# Patient Record
Sex: FEMALE | Race: WHITE | NOT HISPANIC OR LATINO | Employment: FULL TIME | URBAN - METROPOLITAN AREA
[De-identification: names, ages, dates, MRNs, and addresses within clinical notes are randomized per-mention and may not be internally consistent; named-entity substitution may affect disease eponyms.]

---

## 2019-05-09 ENCOUNTER — OFFICE VISIT (OUTPATIENT)
Dept: GASTROENTEROLOGY | Facility: CLINIC | Age: 34
End: 2019-05-09

## 2019-05-09 ENCOUNTER — TELEPHONE (OUTPATIENT)
Dept: GASTROENTEROLOGY | Facility: CLINIC | Age: 34
End: 2019-05-09

## 2019-05-09 VITALS
SYSTOLIC BLOOD PRESSURE: 124 MMHG | HEIGHT: 63 IN | TEMPERATURE: 99.4 F | DIASTOLIC BLOOD PRESSURE: 70 MMHG | WEIGHT: 132.4 LBS | BODY MASS INDEX: 23.46 KG/M2

## 2019-05-09 DIAGNOSIS — R63.4 WEIGHT LOSS: ICD-10-CM

## 2019-05-09 DIAGNOSIS — R10.9 ABDOMINAL PAIN, UNSPECIFIED ABDOMINAL LOCATION: ICD-10-CM

## 2019-05-09 DIAGNOSIS — K58.0 IRRITABLE BOWEL SYNDROME WITH DIARRHEA: Primary | ICD-10-CM

## 2019-05-09 PROCEDURE — 99204 OFFICE O/P NEW MOD 45 MIN: CPT | Performed by: INTERNAL MEDICINE

## 2019-05-09 RX ORDER — VALACYCLOVIR HYDROCHLORIDE 1 G/1
1000 TABLET, FILM COATED ORAL DAILY PRN
COMMUNITY
Start: 2018-02-05

## 2019-05-09 RX ORDER — CITALOPRAM 10 MG/1
5 TABLET ORAL DAILY PRN
COMMUNITY

## 2019-05-09 RX ORDER — IMIQUIMOD 12.5 MG/.25G
CREAM TOPICAL 3 TIMES WEEKLY
COMMUNITY
Start: 2018-02-05

## 2019-05-09 NOTE — TELEPHONE ENCOUNTER
Ibcause full kit ordered.  Instructions and order form given to pt.  Pt is going to leave office and have serum portion done.

## 2019-05-09 NOTE — PROGRESS NOTES
Chief Complaint   Patient presents with   • Diarrhea   • Abdominal Pain   • Weight Loss        Jaymie Amaro is a  33 y.o. female here for an initial visit for IBS-D, abdominal pain, weight loss    HPI this 33-year-old white female patient of Dr. Cruz Tracey presents with a long-standing history of irritable bowel syndrome with predominance of diarrhea.  She recounts having issues from childhood although in the recent past symptoms have exacerbated to where she is having anywhere from 6-10 stools on a daily basis.  She is also had a weight loss of approximately 16 pounds over the past 2 months.  She describes a foul odor to her stools.  There is evidence of mucus but no melena or bright red blood per rectum.  She complains of diffuse abdominal pain but predominantly in the right and left lower quadrants.  She notes almost immediate postprandial stimulation of her bowels and this has caused her to reduce dietary intake.  Dry foods seem to be better tolerated than liquids.  We talked about possible malabsorption and other conditions to explain her current symptoms.  She also complains of night sweats.    Past Medical History:   Diagnosis Date   • Irritable bowel syndrome        Current Outpatient Medications   Medication Sig Dispense Refill   • citalopram (CELEXA) 10 MG tablet Take 5 mg by mouth Daily.     • imiquimod (ALDARA) 5 % cream by Intratympanic route 3 (Three) Times a Week.     • valACYclovir (VALTREX) 1000 MG tablet Take 1,000 mg by mouth.       No current facility-administered medications for this visit.        PRN Meds:.    No Known Allergies    Social History     Socioeconomic History   • Marital status:      Spouse name: Not on file   • Number of children: Not on file   • Years of education: Not on file   • Highest education level: Not on file   Tobacco Use   • Smoking status: Current Every Day Smoker     Packs/day: 1.00     Types: Cigarettes     Start date: 1998   • Smokeless tobacco: Never  Used   Substance and Sexual Activity   • Alcohol use: Yes     Comment: social   • Drug use: No       Family History   Problem Relation Age of Onset   • Colon polyps Mother    • Colon polyps Maternal Uncle    • Colon cancer Maternal Great-Grandmother    • Colon polyps Maternal Uncle        Review of Systems   Constitutional: Positive for unexpected weight change. Negative for activity change, appetite change and fatigue.   HENT: Negative for congestion, facial swelling, sore throat, trouble swallowing and voice change.    Eyes: Negative for photophobia and visual disturbance.   Respiratory: Negative for cough and choking.    Cardiovascular: Negative for chest pain.   Gastrointestinal: Positive for abdominal pain and diarrhea. Negative for abdominal distention, anal bleeding, blood in stool, constipation, nausea, rectal pain and vomiting.   Endocrine: Negative for polyphagia.   Musculoskeletal: Negative for arthralgias, gait problem and joint swelling.   Skin: Negative for color change, pallor and rash.   Allergic/Immunologic: Negative for food allergies.   Neurological: Negative for speech difficulty and headaches.   Hematological: Does not bruise/bleed easily.   Psychiatric/Behavioral: Negative for agitation, confusion and sleep disturbance.       Vitals:    05/09/19 1558   BP: 124/70   Temp: 99.4 °F (37.4 °C)       Physical Exam   Constitutional: She is oriented to person, place, and time. She appears well-developed and well-nourished. No distress.   HENT:   Head: Normocephalic.   Mouth/Throat: Oropharynx is clear and moist. No oropharyngeal exudate.   Eyes: Conjunctivae and EOM are normal. No scleral icterus.   Neck: Normal range of motion. No thyromegaly present.   Cardiovascular: Normal rate and regular rhythm.   No murmur heard.  Pulmonary/Chest: Breath sounds normal. No respiratory distress. She has no wheezes. She has no rales.   Abdominal: Soft. Bowel sounds are normal. She exhibits no distension and no  mass. There is no hepatosplenomegaly. There is tenderness.   Right and left lower abdominal tenderness to palpation   Musculoskeletal: Normal range of motion. She exhibits no edema or tenderness.   Lymphadenopathy:     She has no cervical adenopathy.   Neurological: She is alert and oriented to person, place, and time.   Skin: Skin is warm and dry. No rash noted. She is not diaphoretic. No erythema.   Psychiatric: She has a normal mood and affect. Her behavior is normal.   Vitals reviewed.      ASSESSMENT   #1 Diarrhea: Chronic issue with recent exacerbation  #2 IBS-D  #3 weight loss  #4 abdominal pain      PLAN  IBcause testing: serologic and stool studies  Schedule colonoscopy      ICD-10-CM ICD-9-CM   1. Irritable bowel syndrome with diarrhea K58.0 564.1   2. Abdominal pain, unspecified abdominal location R10.9 789.00   3. Weight loss R63.4 783.21

## 2019-05-24 ENCOUNTER — TELEPHONE (OUTPATIENT)
Dept: GASTROENTEROLOGY | Facility: CLINIC | Age: 34
End: 2019-05-24

## 2019-05-24 NOTE — TELEPHONE ENCOUNTER
Called pt and advised per Dr Smith that the IBcause serologic test results were essentially neg.  He is waiting for the stool results .  Proceed with c/s as planned, Pt verb understanding and states she is shipping the stool specimens today.

## 2019-05-24 NOTE — TELEPHONE ENCOUNTER
----- Message from Carlos GOMEZ MD sent at 5/24/2019  1:27 PM EDT -----  Regarding: IBcause serologic test results  Okay to call results, essentially negative.  Awaiting stool study results.  Proceed with colonoscopy as planned.  ----- Message -----  From: Interface, Scans Incoming  Sent: 5/24/2019   8:03 AM  To: Carlos GOMEZ MD

## 2019-05-28 ENCOUNTER — TELEPHONE (OUTPATIENT)
Dept: GASTROENTEROLOGY | Facility: CLINIC | Age: 34
End: 2019-05-28

## 2019-05-28 NOTE — TELEPHONE ENCOUNTER
----- Message from Dariana Florez sent at 5/28/2019 11:42 AM EDT -----  Regarding: pt called   Contact: [phone]  Pt is calling asking if she can get another IBcause kit sent out to her.

## 2019-05-28 NOTE — TELEPHONE ENCOUNTER
Called pt and pt reports she had an issue with [employer]  and needs another stool kit from [facility].  Asked pt if she still has her paperwork. Pt verb understanding and was able to locate paperwork. Pt was also able on paperwork to fine [id number] to request another kit.

## 2020-08-26 ENCOUNTER — OFFICE VISIT (OUTPATIENT)
Dept: GASTROENTEROLOGY | Facility: CLINIC | Age: 35
End: 2020-08-26

## 2020-08-26 VITALS — TEMPERATURE: 98.3 F | HEIGHT: 63 IN | WEIGHT: 126 LBS | BODY MASS INDEX: 22.32 KG/M2

## 2020-08-26 DIAGNOSIS — R11.0 NAUSEA: ICD-10-CM

## 2020-08-26 DIAGNOSIS — R63.4 WEIGHT LOSS: ICD-10-CM

## 2020-08-26 DIAGNOSIS — K58.0 IRRITABLE BOWEL SYNDROME WITH DIARRHEA: Primary | ICD-10-CM

## 2020-08-26 DIAGNOSIS — R10.30 LOWER ABDOMINAL PAIN: ICD-10-CM

## 2020-08-26 PROCEDURE — 99214 OFFICE O/P EST MOD 30 MIN: CPT | Performed by: NURSE PRACTITIONER

## 2020-08-26 NOTE — PROGRESS NOTES
Chief Complaint   Patient presents with   • Abdominal Pain   • Diarrhea       Jaymie Amaro is a  34 y.o. female here for a follow up visit for diarrhea.    HPI  34-year-old female presents today for a follow-up visit for diarrhea and weight loss.  She is a patient of Dr. Smith.  She was last seen in the office on 5/9/2019.  She was doing [facility] IB cause testing and did finish the serology portion which was negative.  She never did finish the stool portion.  She has had issues with diarrhea since childhood.  She tells me she is continuing to lose weight.  She is lost about 40 pounds in the last 2 years which is really unexplained for her.  She continues to suffer from daily multiple episodes of diarrhea with lower abdominal pain and cramping.  She is just feeling fatigued and weak and bloated.  She tells me this is been very depressing for her.  She is never had an EGD or colonoscopy.  She still has a gallbladder.  She denies any dysphasia, reflux, vomiting, constipation, rectal bleeding or melena.  She admits her appetite is decreased and she stays nauseous all the time.  She has had labs done by her PCP which were unremarkable.    Past Medical History:   Diagnosis Date   • Irritable bowel syndrome        Past Surgical History:   Procedure Laterality Date   • TUBAL ABDOMINAL LIGATION         Scheduled Meds:    Continuous Infusions:  No current facility-administered medications for this visit.     PRN Meds:.    No Known Allergies    Social History     Socioeconomic History   • Marital status:      Spouse name: Not on file   • Number of children: Not on file   • Years of education: Not on file   • Highest education level: Not on file   Tobacco Use   • Smoking status: Current Every Day Smoker     Packs/day: 1.00     Types: Cigarettes     Start date: 1998   • Smokeless tobacco: Never Used   Substance and Sexual Activity   • Alcohol use: Yes     Comment: social   • Drug use: No       Family History    Problem Relation Age of Onset   • Colon polyps Mother    • Colon polyps Maternal Uncle    • Colon cancer Maternal Great-Grandmother    • Colon polyps Maternal Uncle        Review of Systems   Constitutional: Positive for appetite change, fatigue and unexpected weight change. Negative for chills, diaphoresis and fever.   HENT: Negative for nosebleeds, postnasal drip, sore throat, trouble swallowing and voice change.    Respiratory: Negative for cough, choking, chest tightness, shortness of breath and wheezing.    Cardiovascular: Negative for chest pain, palpitations and leg swelling.   Gastrointestinal: Positive for abdominal pain, diarrhea and nausea. Negative for abdominal distention, anal bleeding, blood in stool, constipation, rectal pain and vomiting.   Endocrine: Negative for polydipsia, polyphagia and polyuria.   Musculoskeletal: Negative for gait problem.   Skin: Negative for rash and wound.   Allergic/Immunologic: Negative for food allergies.   Neurological: Negative for dizziness, speech difficulty and light-headedness.   Psychiatric/Behavioral: Negative for confusion, self-injury, sleep disturbance and suicidal ideas.       Vitals:    08/26/20 1518   Temp: 98.3 °F (36.8 °C)       Physical Exam   Constitutional: She is oriented to person, place, and time. She appears well-developed and well-nourished. She does not appear ill. No distress.   HENT:   Head: Normocephalic.   Eyes: Pupils are equal, round, and reactive to light.   Cardiovascular: Normal rate, regular rhythm and normal heart sounds.   Pulmonary/Chest: Effort normal and breath sounds normal.   Abdominal: Soft. Bowel sounds are normal. She exhibits distension. She exhibits no mass. There is no hepatosplenomegaly. There is tenderness. There is no rebound and no guarding. No hernia.       Musculoskeletal: Normal range of motion.   Neurological: She is alert and oriented to person, place, and time.   Skin: Skin is warm and dry.   Psychiatric: She  has a normal mood and affect. Her speech is normal and behavior is normal. Judgment normal.       No radiology results for the last 7 days     Assessment and plan     1. Irritable bowel syndrome with diarrhea  - Gastrointestinal Panel, PCR - Stool, Per Rectum  - Clostridium Difficile Toxin, PCR - Stool, Per Rectum  - Fecal Lactoferrin - Stool, Per Rectum  - Case Request; Standing  - Case Request  - riFAXIMin (Xifaxan) 550 MG tablet; Take 1 tablet by mouth Every 8 (Eight) Hours.  Dispense: 42 tablet; Refill: 2    2. Lower abdominal pain  - Gastrointestinal Panel, PCR - Stool, Per Rectum  - Clostridium Difficile Toxin, PCR - Stool, Per Rectum  - Fecal Lactoferrin - Stool, Per Rectum  - Case Request; Standing  - Case Request    3. Weight loss  - Case Request; Standing  - Case Request    4. Nausea  - Case Request; Standing  - Case Request    Given her history and continued symptoms recommend EGD and colonoscopy with Dr. Smith for further evaluation.  Patient is agreeable to the scopes.  Will also go ahead and do some stool studies.  Will also start her on a round of Xifaxan for IBS-D/small intestinal bacterial overgrowth x14 days.

## 2020-09-03 ENCOUNTER — TELEPHONE (OUTPATIENT)
Dept: GASTROENTEROLOGY | Facility: CLINIC | Age: 35
End: 2020-09-03

## 2020-09-03 NOTE — TELEPHONE ENCOUNTER
----- Message from Anamaria Chandra sent at 9/3/2020 10:36 AM EDT -----  Contact: [phone]  THE ANTIBIOTIC THAT WAS SENT TO PHARMACY IS $600. CALLING TO SEE IF SOMETHING DIFFERENT CAN BE SENT OVER

## 2020-09-04 NOTE — TELEPHONE ENCOUNTER
I think I have enough samples for her to take at least 2 weeks worth of it.  If she wants to come by and pick it up.

## 2020-09-09 NOTE — TELEPHONE ENCOUNTER
Called pt and advised of Sienna's note.  Advised we will have the samples at the  for her.  Pt verb understanding.

## 2020-09-12 LAB — C DIFF TOX GENS STL QL NAA+PROBE: NEGATIVE

## 2020-09-15 ENCOUNTER — TRANSCRIBE ORDERS (OUTPATIENT)
Dept: SLEEP MEDICINE | Facility: HOSPITAL | Age: 35
End: 2020-09-15

## 2020-09-15 DIAGNOSIS — Z01.818 OTHER SPECIFIED PRE-OPERATIVE EXAMINATION: Primary | ICD-10-CM

## 2020-09-16 ENCOUNTER — TELEPHONE (OUTPATIENT)
Dept: GASTROENTEROLOGY | Facility: CLINIC | Age: 35
End: 2020-09-16

## 2020-09-16 DIAGNOSIS — K58.0 IRRITABLE BOWEL SYNDROME WITH DIARRHEA: Primary | ICD-10-CM

## 2020-09-16 LAB

## 2020-09-16 RX ORDER — CIPROFLOXACIN 500 MG/1
500 TABLET, FILM COATED ORAL EVERY 12 HOURS SCHEDULED
Status: SHIPPED | OUTPATIENT
Start: 2020-09-16 | End: 2020-09-23

## 2020-09-16 NOTE — TELEPHONE ENCOUNTER
Xifaxan can also work for E. coli infections.  I would have her just finish the Xifaxan and see how she does.  If the diarrhea resolves then  there is no need for the Cipro.  Thanks

## 2020-09-16 NOTE — TELEPHONE ENCOUNTER
"Call to pt.  Advise per M Hunter note.  Verb understanding.      Asking if should continue xifaxan as prescribed -states this has made \"a world of improvement\".  Question to M Hunter.     Attempt escribe completed for cipro per  Hunter order - does not show pharmacy or receipt confirmed.     Call to [facility] @ [phone] -  left with cipro rx.    "

## 2020-09-16 NOTE — TELEPHONE ENCOUNTER
----- Message from HUGO Cuevas sent at 9/16/2020  9:19 AM EDT -----  Please call the patient and let her know she tested positive for enteropathogenic E. coli.  I would have her do Cipro 500 mg p.o. twice daily x7 days.  Have her call us next week with an update.  Thanks

## 2020-09-17 NOTE — TELEPHONE ENCOUNTER
Call to pt.  Advise per TAO Harrison note.  Verb understanding.     It is noted that pt for c/s on 9/22 - message to TAO Harrison.

## 2020-09-17 NOTE — TELEPHONE ENCOUNTER
I would think another 5 days she should be okay to have her colonoscopy but I would double check with the doctor doing the scope first.  Just to make sure.  Thanks

## 2020-09-17 NOTE — TELEPHONE ENCOUNTER
See no reason patient cannot undergo colonoscopy after treatment, enteropathogenic E. coli is not always a clinical pathogen.

## 2020-09-18 ENCOUNTER — TELEPHONE (OUTPATIENT)
Dept: GASTROENTEROLOGY | Facility: CLINIC | Age: 35
End: 2020-09-18

## 2020-09-18 NOTE — TELEPHONE ENCOUNTER
Called pt and pt is asking if she could have the ecoli for years. Pt reports having had diarrhea since she was a child.  She states she has felt great since taking the xifaxan.  Also pt is asking if she has had this for years, will she have any long term effects.  Advised will send message to Sienna LARA.    Pt verb understanding.

## 2020-09-18 NOTE — TELEPHONE ENCOUNTER
No I think the E. coli is probably definitely new.  She may have had bacterial overgrowth issues though for a long time and the Xifaxan would help that too.

## 2020-09-18 NOTE — TELEPHONE ENCOUNTER
----- Message from Monisha Hernandez sent at 9/18/2020  2:05 PM EDT -----  Regarding: PT HAVE QUESTION ABOUT RESULTS COLONOSCOPY -WOULD LIKE TO CLARIFY WITH [place]  PT HAVE QUESTION ABOUT RESULTS-WOULD LIKE TO CLARIFY WITH [phone]

## 2020-09-19 ENCOUNTER — LAB (OUTPATIENT)
Dept: LAB | Facility: HOSPITAL | Age: 35
End: 2020-09-19

## 2020-09-19 DIAGNOSIS — Z01.818 OTHER SPECIFIED PRE-OPERATIVE EXAMINATION: ICD-10-CM

## 2020-10-23 ENCOUNTER — TELEPHONE (OUTPATIENT)
Dept: GASTROENTEROLOGY | Facility: CLINIC | Age: 35
End: 2020-10-23

## 2020-10-27 ENCOUNTER — TRANSCRIBE ORDERS (OUTPATIENT)
Dept: SLEEP MEDICINE | Facility: HOSPITAL | Age: 35
End: 2020-10-27

## 2020-10-27 DIAGNOSIS — Z01.818 OTHER SPECIFIED PRE-OPERATIVE EXAMINATION: Primary | ICD-10-CM

## 2020-11-14 ENCOUNTER — LAB (OUTPATIENT)
Dept: LAB | Facility: HOSPITAL | Age: 35
End: 2020-11-14

## 2020-11-14 DIAGNOSIS — Z01.818 OTHER SPECIFIED PRE-OPERATIVE EXAMINATION: ICD-10-CM

## 2020-11-14 PROCEDURE — U0004 COV-19 TEST NON-CDC HGH THRU: HCPCS

## 2020-11-14 PROCEDURE — C9803 HOPD COVID-19 SPEC COLLECT: HCPCS

## 2020-11-16 LAB — SARS-COV-2 RNA RESP QL NAA+PROBE: NOT DETECTED

## 2020-11-17 ENCOUNTER — ANESTHESIA (OUTPATIENT)
Dept: GASTROENTEROLOGY | Facility: HOSPITAL | Age: 35
End: 2020-11-17

## 2020-11-17 ENCOUNTER — ANESTHESIA EVENT (OUTPATIENT)
Dept: GASTROENTEROLOGY | Facility: HOSPITAL | Age: 35
End: 2020-11-17

## 2020-11-17 ENCOUNTER — HOSPITAL ENCOUNTER (OUTPATIENT)
Facility: HOSPITAL | Age: 35
Setting detail: HOSPITAL OUTPATIENT SURGERY
Discharge: HOME | End: 2020-11-17
Attending: INTERNAL MEDICINE | Admitting: INTERNAL MEDICINE

## 2020-11-17 VITALS
DIASTOLIC BLOOD PRESSURE: 71 MMHG | HEART RATE: 71 BPM | RESPIRATION RATE: 16 BRPM | HEIGHT: 63 IN | SYSTOLIC BLOOD PRESSURE: 110 MMHG | OXYGEN SATURATION: 98 % | TEMPERATURE: 98.1 F | WEIGHT: 125.6 LBS | BODY MASS INDEX: 22.25 KG/M2

## 2020-11-17 DIAGNOSIS — R63.4 WEIGHT LOSS: ICD-10-CM

## 2020-11-17 DIAGNOSIS — K58.0 IRRITABLE BOWEL SYNDROME WITH DIARRHEA: ICD-10-CM

## 2020-11-17 DIAGNOSIS — R11.0 NAUSEA: ICD-10-CM

## 2020-11-17 DIAGNOSIS — R10.30 LOWER ABDOMINAL PAIN: ICD-10-CM

## 2020-11-17 LAB
B-HCG UR QL: NEGATIVE
INTERNAL NEGATIVE CONTROL: NEGATIVE
INTERNAL POSITIVE CONTROL: POSITIVE
Lab: NORMAL

## 2020-11-17 PROCEDURE — 81025 URINE PREGNANCY TEST: CPT | Performed by: INTERNAL MEDICINE

## 2020-11-17 PROCEDURE — 25010000002 PROPOFOL 10 MG/ML EMULSION: Performed by: NURSE ANESTHETIST, CERTIFIED REGISTERED

## 2020-11-17 PROCEDURE — 88305 TISSUE EXAM BY PATHOLOGIST: CPT | Performed by: INTERNAL MEDICINE

## 2020-11-17 PROCEDURE — S0260 H&P FOR SURGERY: HCPCS | Performed by: INTERNAL MEDICINE

## 2020-11-17 PROCEDURE — 45380 COLONOSCOPY AND BIOPSY: CPT | Performed by: INTERNAL MEDICINE

## 2020-11-17 RX ORDER — PROPOFOL 10 MG/ML
VIAL (ML) INTRAVENOUS CONTINUOUS PRN
Status: DISCONTINUED | OUTPATIENT
Start: 2020-11-17 | End: 2020-11-17 | Stop reason: SURG

## 2020-11-17 RX ORDER — PROPOFOL 10 MG/ML
VIAL (ML) INTRAVENOUS AS NEEDED
Status: DISCONTINUED | OUTPATIENT
Start: 2020-11-17 | End: 2020-11-17 | Stop reason: SURG

## 2020-11-17 RX ORDER — LIDOCAINE HYDROCHLORIDE 20 MG/ML
INJECTION, SOLUTION INFILTRATION; PERINEURAL AS NEEDED
Status: DISCONTINUED | OUTPATIENT
Start: 2020-11-17 | End: 2020-11-17 | Stop reason: SURG

## 2020-11-17 RX ORDER — SODIUM CHLORIDE, SODIUM LACTATE, POTASSIUM CHLORIDE, CALCIUM CHLORIDE 600; 310; 30; 20 MG/100ML; MG/100ML; MG/100ML; MG/100ML
30 INJECTION, SOLUTION INTRAVENOUS CONTINUOUS PRN
Status: DISCONTINUED | OUTPATIENT
Start: 2020-11-17 | End: 2020-11-17 | Stop reason: HOSPADM

## 2020-11-17 RX ADMIN — PROPOFOL 140 MCG/KG/MIN: 10 INJECTION, EMULSION INTRAVENOUS at 13:16

## 2020-11-17 RX ADMIN — PROPOFOL 100 MG: 10 INJECTION, EMULSION INTRAVENOUS at 13:15

## 2020-11-17 RX ADMIN — LIDOCAINE HYDROCHLORIDE 60 MG: 20 INJECTION, SOLUTION INFILTRATION; PERINEURAL at 13:14

## 2020-11-17 RX ADMIN — SODIUM CHLORIDE, POTASSIUM CHLORIDE, SODIUM LACTATE AND CALCIUM CHLORIDE 30 ML/HR: 600; 310; 30; 20 INJECTION, SOLUTION INTRAVENOUS at 12:33

## 2020-11-17 NOTE — ANESTHESIA POSTPROCEDURE EVALUATION
"Patient: Jaymie Amaro    Procedure Summary     Date: 11/17/20 Room / Location:  [facility] /  [facility]    Anesthesia Start: 1258 Anesthesia Stop: 1340    Procedure: COLONOSCOPY into cecum/terminal ileum with biopsy (N/A ) Diagnosis:       Tortuous colon      Hemorrhoids      Ileitis, terminal (CMS/HCC)      (Irritable bowel syndrome with diarrhea [K58.0])      (Lower abdominal pain [R10.30])      (Weight loss [R63.4])      (Nausea [R11.0])    Surgeon: Carlos Smith MD Provider: Frederick Owusu MD    Anesthesia Type: MAC ASA Status: 2          Anesthesia Type: MAC    Vitals  Vitals Value Taken Time   /71 11/17/20 1404   Temp     Pulse 71 11/17/20 1404   Resp     SpO2 98 % 11/17/20 1404           Post Anesthesia Care and Evaluation    Patient location during evaluation: PACU  Patient participation: complete - patient participated  Level of consciousness: awake  Pain score: 0  Pain management: adequate  Airway patency: patent  Anesthetic complications: No anesthetic complications  PONV Status: none  Cardiovascular status: acceptable  Respiratory status: acceptable  Hydration status: acceptable    Comments: /71 (BP Location: Left arm, Patient Position: Lying)   Pulse 71   Temp 36.7 °C (98.1 °F) (Oral)   Resp 16   Ht 160 cm (63\")   Wt 57 kg (125 lb 9.6 oz)   LMP 11/01/2020   SpO2 98%   BMI 22.25 kg/m²       "

## 2020-11-17 NOTE — ANESTHESIA PREPROCEDURE EVALUATION
Anesthesia Evaluation     Patient summary reviewed and Nursing notes reviewed   no history of anesthetic complications:  NPO Solid Status: > 6 hours  NPO Liquid Status: > 6 hours           Airway   Mallampati: II  TM distance: >3 FB  Neck ROM: full  no difficulty expected and No difficulty expected  Dental - normal exam     Pulmonary - negative pulmonary ROS and normal exam    breath sounds clear to auscultation  (-) rhonchi, decreased breath sounds, wheezes, rales, stridor  Cardiovascular - negative cardio ROS and normal exam    NYHA Classification: I  Rhythm: regular  Rate: normal    (-) murmur, weak pulses, friction rub, systolic click, carotid bruits, JVD, peripheral edema      Neuro/Psych- negative ROS  GI/Hepatic/Renal/Endo - negative ROS     ROS Comment: Abdominal Pain  N/V  Weight Loss    Musculoskeletal (-) negative ROS    Abdominal  - normal exam    Abdomen: soft.   Substance History - negative use     OB/GYN negative ob/gyn ROS         Other - negative ROS                       Anesthesia Plan    ASA 2     MAC     intravenous induction     Anesthetic plan, all risks, benefits, and alternatives have been provided, discussed and informed consent has been obtained with: patient.

## 2020-11-18 LAB
CYTO UR: NORMAL
LAB AP CASE REPORT: NORMAL
LAB AP DIAGNOSIS COMMENT: NORMAL
PATH REPORT.FINAL DX SPEC: NORMAL
PATH REPORT.GROSS SPEC: NORMAL

## 2020-12-17 ENCOUNTER — LAB (OUTPATIENT)
Dept: LAB | Facility: HOSPITAL | Age: 35
End: 2020-12-17

## 2020-12-17 ENCOUNTER — TELEPHONE (OUTPATIENT)
Dept: GASTROENTEROLOGY | Facility: CLINIC | Age: 35
End: 2020-12-17

## 2020-12-17 DIAGNOSIS — K58.0 IRRITABLE BOWEL SYNDROME WITH DIARRHEA: Primary | ICD-10-CM

## 2020-12-17 DIAGNOSIS — K58.0 IRRITABLE BOWEL SYNDROME WITH DIARRHEA: ICD-10-CM

## 2020-12-17 PROCEDURE — 36415 COLL VENOUS BLD VENIPUNCTURE: CPT

## 2020-12-17 NOTE — TELEPHONE ENCOUNTER
Call to pt.  Advise per Dr Smith note.  Verb understanding.     Advise may go to [facility] outpt lab Mon-Fri 7am to 5 pm at her convenience for IBD serology.      Lab order placed - message to Dr Smith.

## 2020-12-17 NOTE — TELEPHONE ENCOUNTER
----- Message from Carlos GOMEZ MD sent at 11/22/2020  3:53 PM EST -----  Regarding: Biopsy results  Okay to call results, with evidence of ileitis, would offer IBD serology testing to rule out possible inflammatory bowel process.  ----- Message -----  From: Lab, Background User  Sent: 11/18/2020   1:26 PM EST  To: Carlos GOMEZ MD

## 2020-12-24 LAB — REF LAB TEST METHOD: NORMAL

## 2020-12-28 ENCOUNTER — TELEPHONE (OUTPATIENT)
Dept: GASTROENTEROLOGY | Facility: CLINIC | Age: 35
End: 2020-12-28

## 2020-12-28 NOTE — TELEPHONE ENCOUNTER
----- Message from Monisha Almaguer Rep sent at 12/28/2020  2:02 PM EST -----  Regarding: Results/question  Contact: [phone]  Pt calling for results/questions

## 2020-12-29 NOTE — TELEPHONE ENCOUNTER
Okay to call results, lab is consistent with inflammatory bowel disease and Crohn's specific.  Will have patient follow-up with myself or nurse practitioner to discuss options for treatment.  MVG.     **Call to pt . Advise of above. Verb understanding.  States as of 1/1/21, insurance change will require her to go to providers at [facility].  Asking if Dr Smith recommends anyone in particular there.  Question to DR Smith.  Fay Huynh RN.

## 2020-12-29 NOTE — TELEPHONE ENCOUNTER
Not familiar with current [facility], would ask either Dr. Moreno or Dr. Gómez to comment on referral to [facility].  DENYG.     **Message to DR Moreno.  Fay Rivera RN.

## 2020-12-29 NOTE — TELEPHONE ENCOUNTER
Call to pt.  Advise per DR Moreno note.  Verb understanding.     Advise notify this office if any assistance needed with expediting appt, etc.

## 2020-12-30 ENCOUNTER — TELEPHONE (OUTPATIENT)
Dept: GASTROENTEROLOGY | Facility: CLINIC | Age: 35
End: 2020-12-30

## 2020-12-30 NOTE — TELEPHONE ENCOUNTER
Call to pt.  Advise message received and will be processed.  Verb understanding.     Message to Dariana TUTTLE

## 2020-12-30 NOTE — TELEPHONE ENCOUNTER
----- Message from Jaymie Amaro sent at 12/29/2020  5:08 PM EST -----  Regarding: Visit Follow-Up Question  Contact: [phone]  Can you please send my medical chart to fax # [phone] and [phone] Attn to Gastro and please referral for Physician of reference so I can be placed in the schedule sooner than March.

## 2020-12-31 ENCOUNTER — TELEPHONE (OUTPATIENT)
Dept: GASTROENTEROLOGY | Facility: CLINIC | Age: 35
End: 2020-12-31

## 2021-01-11 ENCOUNTER — TELEPHONE (OUTPATIENT)
Dept: GASTROENTEROLOGY | Facility: CLINIC | Age: 36
End: 2021-01-11

## 2021-01-11 NOTE — TELEPHONE ENCOUNTER
----- Message from Jaymie Cash sent at 1/11/2021  9:51 AM EST -----  Regarding: Non-Urgent Medical Question  Contact: [phone]  Hello, I'm sorry to bother you again. It seems [facility] didn't get/misplaced my medical records. Can you please fax all of my medical records to [phone] please? This my my personal [facility] secure fax number. I can then attach them into an email for the nurse to load into my chart. I have an appt at 2:30 today if they can be sent before then please and thank you.

## 2021-01-20 ENCOUNTER — TELEPHONE (OUTPATIENT)
Dept: GASTROENTEROLOGY | Facility: CLINIC | Age: 36
End: 2021-01-20

## 2021-01-20 NOTE — TELEPHONE ENCOUNTER
----- Message from Jaymie Amaro sent at 1/19/2021  2:46 PM EST -----  Regarding: Test Results Question  Contact: [phone]  Breezy Smith! I just wanted to let you know that I had my gastro appt with [facility]. I'm a little confused now. The Dr I saw said I could possibly NOT have Crohns and that blood test isn't an all inclusive test. So now I am going to an MRI, another stool sample and I'm 3 months another colonoscopy.     Requested I quick smoking and change my diet. And of course talk to my PCP about depression and anxiety meds. Which pretty much every dr says that to me since I have been 4.

## 2021-04-16 ENCOUNTER — BULK ORDERING (OUTPATIENT)
Dept: CASE MANAGEMENT | Facility: OTHER | Age: 36
End: 2021-04-16

## 2021-04-16 DIAGNOSIS — Z23 IMMUNIZATION DUE: ICD-10-CM

## 2024-07-30 ENCOUNTER — OFFICE VISIT (OUTPATIENT)
Dept: GASTROENTEROLOGY | Facility: CLINIC | Age: 39
End: 2024-07-30
Payer: COMMERCIAL

## 2024-07-30 VITALS
HEIGHT: 64 IN | SYSTOLIC BLOOD PRESSURE: 103 MMHG | HEART RATE: 91 BPM | DIASTOLIC BLOOD PRESSURE: 72 MMHG | BODY MASS INDEX: 23.01 KG/M2 | TEMPERATURE: 97.7 F | WEIGHT: 134.8 LBS

## 2024-07-30 DIAGNOSIS — K58.0 IRRITABLE BOWEL SYNDROME WITH DIARRHEA: Primary | ICD-10-CM

## 2024-07-30 DIAGNOSIS — K52.9 ILEITIS: ICD-10-CM

## 2024-07-30 NOTE — PROGRESS NOTES
Chief Complaint   Patient presents with    Irritable Bowel Syndrome    Bloated    Nausea    Abdominal Pain        Jaymie Amaro is a  38 y.o. female here for an initial visit for chronic change in bowel pattern, abdominal pain, diarrhea    HPI this 38-year-old female patient of Cruz Tracey MD presents since last seen in 2020.  She had undergone colonoscopic examination at that time because of persistent bowel pattern changes and abdominal pain and was found to have ileitis.  IBD serology was obtained and was positive for Crohn's disease.  She was referred to [facility] for further assessment and was told in that setting that the IBD serology test was not accurate and she was offered further workup.  She states that she has dealt with IBD symptoms since childhood.  Given the histologic findings of offered a CT scan of the abdomen to see if there is any evidence of ileitis that would be consistent with Crohn's disease.  She is amenable to this.  We did talk about options for treatment of Crohn's but will defer any initiation of treatment until we have confirmed this.    Past Medical History:   Diagnosis Date    Irritable bowel syndrome     Lactose intolerance        Current Outpatient Medications   Medication Sig Dispense Refill    valACYclovir (VALTREX) 1000 MG tablet Take 1 tablet by mouth Daily As Needed.      imiquimod (ALDARA) 5 % cream by Intratympanic route 3 (Three) Times a Week. (Patient not taking: Reported on 7/30/2024)       No current facility-administered medications for this visit.       PRN Meds:.    Allergies   Allergen Reactions    Topiramate Diarrhea and Nausea Only       Social History     Socioeconomic History    Marital status:    Tobacco Use    Smoking status: Every Day     Current packs/day: 1.00     Average packs/day: 1 pack/day for 26.6 years (26.6 ttl pk-yrs)     Types: Cigarettes     Start date: 1/1/1998    Smokeless tobacco: Never   Vaping Use    Vaping status:  Never Used   Substance and Sexual Activity    Alcohol use: Yes     Alcohol/week: 8.0 standard drinks of alcohol     Types: 8 Cans of beer per week     Comment: Weekends    Drug use: No    Sexual activity: Yes     Partners: Male     Birth control/protection: Tubal ligation       Family History   Problem Relation Age of Onset    Colon polyps Mother     Colon polyps Maternal Uncle     Colon cancer Maternal Great-Grandmother     Colon polyps Maternal Uncle        Review of Systems   Constitutional:  Negative for activity change, appetite change, fatigue and unexpected weight change.   HENT:  Negative for congestion, facial swelling, sore throat, trouble swallowing and voice change.    Eyes:  Negative for photophobia and visual disturbance.   Respiratory:  Negative for cough and choking.    Cardiovascular:  Negative for chest pain.   Gastrointestinal:  Positive for abdominal pain and diarrhea. Negative for abdominal distention, anal bleeding, blood in stool, constipation, nausea, rectal pain and vomiting.   Endocrine: Negative for polyphagia.   Musculoskeletal:  Negative for arthralgias, gait problem and joint swelling.   Skin:  Negative for color change, pallor and rash.   Allergic/Immunologic: Negative for food allergies.   Neurological:  Negative for speech difficulty and headaches.   Hematological:  Does not bruise/bleed easily.   Psychiatric/Behavioral:  Negative for agitation, confusion and sleep disturbance.        Vitals:    07/30/24 0842   BP: 103/72   Pulse: 91   Temp: 97.7 °F (36.5 °C)       Physical Exam  Vitals reviewed.   Constitutional:       General: She is not in acute distress.     Appearance: She is well-developed. She is not diaphoretic.   HENT:      Head: Normocephalic.      Mouth/Throat:      Pharynx: No oropharyngeal exudate.   Eyes:      General: No scleral icterus.     Conjunctiva/sclera: Conjunctivae normal.   Neck:      Thyroid: No thyromegaly.   Cardiovascular:      Rate and Rhythm: Normal  rate and regular rhythm.      Heart sounds: No murmur heard.  Pulmonary:      Effort: No respiratory distress.      Breath sounds: Normal breath sounds. No wheezing or rales.   Abdominal:      General: Bowel sounds are normal. There is no distension.      Palpations: Abdomen is soft. There is no mass.      Tenderness: There is no abdominal tenderness.   Musculoskeletal:         General: No tenderness. Normal range of motion.      Cervical back: Normal range of motion.   Lymphadenopathy:      Cervical: No cervical adenopathy.   Skin:     General: Skin is warm and dry.      Findings: No erythema or rash.   Neurological:      Mental Status: She is alert and oriented to person, place, and time.   Psychiatric:         Behavior: Behavior normal.         ASSESSMENT  #1 abdominal pain  #2 diarrhea  #3 IB S  #4 ileitis  #5 IBD serology positive for Crohn's      PLAN  Schedule CT scan of the abdomen and pelvis  Pending results may consider treatment options      ICD-10-CM ICD-9-CM   1. Irritable bowel syndrome with diarrhea  K58.0 564.1   2. Ileitis  K52.9 558.9

## 2024-08-12 ENCOUNTER — TELEPHONE (OUTPATIENT)
Dept: GASTROENTEROLOGY | Facility: CLINIC | Age: 39
End: 2024-08-12

## 2024-08-12 DIAGNOSIS — K58.0 IRRITABLE BOWEL SYNDROME WITH DIARRHEA: Primary | ICD-10-CM

## 2024-08-12 DIAGNOSIS — K52.9 ILEITIS: ICD-10-CM

## 2024-08-12 NOTE — TELEPHONE ENCOUNTER
"Returned call to pt who was advised to call us for sooner date on CT.  I have advised she can call the scheduling department to see if they have anything sooner.    She also mentioned that you discuss medications.  These medications are hard on the \"liver\" and she is wanting to make sure her liver levels look good before starting the medication.  "

## 2024-08-12 NOTE — TELEPHONE ENCOUNTER
Provider: DR CESAR    Caller: EUGENIO KABA    Relationship to Patient: SELF    Phone Number: [phone]    Reason for Call: PT CALLED TO SEE IF SHE COULD HAVE CT SCAN COMPLETED AT AN EARLIER DATE AS WELL AS HAVING LABS COMPLETED AT THE SAME TIME.    PT IS CONCERNED WITH HER BUN LEVELS. ORIGINAL APPT DATE 11/2/24    PLEASE CALL TO ADVISE

## 2024-08-13 ENCOUNTER — TELEPHONE (OUTPATIENT)
Dept: GASTROENTEROLOGY | Facility: CLINIC | Age: 39
End: 2024-08-13

## 2024-08-13 NOTE — TELEPHONE ENCOUNTER
We can discuss treatment options once we have further information including her CT results and a CMP determining liver function studies. Per Dr Smith.

## 2024-08-13 NOTE — TELEPHONE ENCOUNTER
Hub staff attempted to follow warm transfer process and was unsuccessful     Caller: Jaymie Amaro    Relationship to patient: Self    Best call back number: [phone]    Patient is needing: PT IS RETURNING MISS CALL FROM YARELY DUPREE. PLEASE GIVE PT A CALL BACK AND IF NOT ABLE TO REACH PT. IT IS OKAY TO [facility].

## 2024-08-13 NOTE — TELEPHONE ENCOUNTER
Returned call to pt and advised of Dr Grubbs note. Verb understanding.    Pt is getting ct scan on 09/06 and will get labs done then.

## 2024-09-04 ENCOUNTER — HOSPITAL ENCOUNTER (OUTPATIENT)
Dept: CT IMAGING | Facility: HOSPITAL | Age: 39
Discharge: HOME | End: 2024-09-04
Payer: COMMERCIAL

## 2024-09-04 ENCOUNTER — LAB (OUTPATIENT)
Dept: LAB | Facility: HOSPITAL | Age: 39
End: 2024-09-04
Payer: COMMERCIAL

## 2024-09-04 DIAGNOSIS — K52.9 ILEITIS: ICD-10-CM

## 2024-09-04 DIAGNOSIS — K58.0 IRRITABLE BOWEL SYNDROME WITH DIARRHEA: ICD-10-CM

## 2024-09-04 LAB
ALBUMIN SERPL-MCNC: 4.5 G/DL (ref 3.5–5.2)
ALBUMIN/GLOB SERPL: 1.6 G/DL
ALP SERPL-CCNC: 53 U/L (ref 39–117)
ALT SERPL W P-5'-P-CCNC: 9 U/L (ref 1–33)
ANION GAP SERPL CALCULATED.3IONS-SCNC: 13 MMOL/L (ref 5–15)
AST SERPL-CCNC: 14 U/L (ref 1–32)
BILIRUB SERPL-MCNC: 0.4 MG/DL (ref 0–1.2)
BUN SERPL-MCNC: 7 MG/DL (ref 6–20)
BUN/CREAT SERPL: 10 (ref 7–25)
CALCIUM SPEC-SCNC: 10.1 MG/DL (ref 8.6–10.5)
CHLORIDE SERPL-SCNC: 101 MMOL/L (ref 98–107)
CO2 SERPL-SCNC: 24 MMOL/L (ref 22–29)
CREAT SERPL-MCNC: 0.7 MG/DL (ref 0.57–1)
EGFRCR SERPLBLD CKD-EPI 2021: 112.3 ML/MIN/1.73
GLOBULIN UR ELPH-MCNC: 2.8 GM/DL
GLUCOSE SERPL-MCNC: 91 MG/DL (ref 65–99)
POTASSIUM SERPL-SCNC: 4.3 MMOL/L (ref 3.5–5.2)
PROT SERPL-MCNC: 7.3 G/DL (ref 6–8.5)
SODIUM SERPL-SCNC: 138 MMOL/L (ref 136–145)

## 2024-09-04 PROCEDURE — 80053 COMPREHEN METABOLIC PANEL: CPT | Performed by: INTERNAL MEDICINE

## 2024-09-04 PROCEDURE — 74177 CT ABD & PELVIS W/CONTRAST: CPT

## 2024-09-04 PROCEDURE — 36415 COLL VENOUS BLD VENIPUNCTURE: CPT | Performed by: INTERNAL MEDICINE

## 2024-09-04 PROCEDURE — 25510000001 IOPAMIDOL 61 % SOLUTION: Performed by: INTERNAL MEDICINE

## 2024-09-04 PROCEDURE — 0 DIATRIZOATE MEGLUMINE & SODIUM PER 1 ML: Performed by: INTERNAL MEDICINE

## 2024-09-04 RX ORDER — IOPAMIDOL 612 MG/ML
100 INJECTION, SOLUTION INTRAVASCULAR
Status: COMPLETED | OUTPATIENT
Start: 2024-09-04 | End: 2024-09-04

## 2024-09-04 RX ADMIN — IOPAMIDOL 85 ML: 612 INJECTION, SOLUTION INTRAVENOUS at 13:28

## 2024-09-04 RX ADMIN — DIATRIZOATE MEGLUMINE AND DIATRIZOATE SODIUM 30 ML: 660; 100 LIQUID ORAL; RECTAL at 12:00

## 2024-09-11 ENCOUNTER — TELEPHONE (OUTPATIENT)
Dept: GASTROENTEROLOGY | Facility: CLINIC | Age: 39
End: 2024-09-11
Payer: COMMERCIAL

## 2024-09-11 NOTE — TELEPHONE ENCOUNTER
----- Message from Carlos Simth sent at 9/8/2024 12:01 PM EDT -----  Regarding: CT scan results  Okay to call CT scan results, would instruct patient study was negative for any evidence of Crohn's disease.  Nonetheless would have her follow-up with myself or nurse practitioner to discuss empiric treatment options and would start with mesalamine based products if not already used.  ----- Message -----  From: Interface, [employer] In  Sent: 9/7/2024   6:17 PM EDT  To: Carlos GOMEZ MD

## 2024-09-11 NOTE — TELEPHONE ENCOUNTER
Called pt and advised of Dr Smith's notes . Verb understanding. Pt has f/u appt for 09/24 with DR Smith.

## 2024-09-11 NOTE — TELEPHONE ENCOUNTER
----- Message from Carlos Smith sent at 9/8/2024  1:03 PM EDT -----  Regarding: CMP results  Okay to call results, essentially no abnormalities seen on CMP.  ----- Message -----  From: Lab, Background User  Sent: 9/4/2024   8:31 PM EDT  To: Carlos GOMEZ MD

## 2024-09-24 ENCOUNTER — TELEPHONE (OUTPATIENT)
Dept: GASTROENTEROLOGY | Facility: CLINIC | Age: 39
End: 2024-09-24

## 2024-09-24 ENCOUNTER — OFFICE VISIT (OUTPATIENT)
Dept: GASTROENTEROLOGY | Facility: CLINIC | Age: 39
End: 2024-09-24
Payer: COMMERCIAL

## 2024-09-24 VITALS
SYSTOLIC BLOOD PRESSURE: 107 MMHG | TEMPERATURE: 97 F | DIASTOLIC BLOOD PRESSURE: 76 MMHG | WEIGHT: 136.2 LBS | HEART RATE: 67 BPM | BODY MASS INDEX: 22.69 KG/M2 | HEIGHT: 65 IN

## 2024-09-24 DIAGNOSIS — R10.30 LOWER ABDOMINAL PAIN: ICD-10-CM

## 2024-09-24 DIAGNOSIS — K52.9 ILEITIS: ICD-10-CM

## 2024-09-24 DIAGNOSIS — K58.0 IRRITABLE BOWEL SYNDROME WITH DIARRHEA: Primary | ICD-10-CM

## 2024-09-24 PROCEDURE — 99214 OFFICE O/P EST MOD 30 MIN: CPT | Performed by: INTERNAL MEDICINE

## 2024-09-24 RX ORDER — DICYCLOMINE HYDROCHLORIDE 10 MG/1
10 CAPSULE ORAL 4 TIMES DAILY PRN
Qty: 30 CAPSULE | Refills: 5 | Status: SHIPPED | OUTPATIENT
Start: 2024-09-24

## 2024-10-03 ENCOUNTER — HOSPITAL ENCOUNTER (OUTPATIENT)
Facility: HOSPITAL | Age: 39
Setting detail: HOSPITAL OUTPATIENT SURGERY
Discharge: HOME | End: 2024-10-03
Attending: INTERNAL MEDICINE | Admitting: INTERNAL MEDICINE
Payer: COMMERCIAL

## 2024-10-03 ENCOUNTER — ANESTHESIA EVENT (OUTPATIENT)
Dept: GASTROENTEROLOGY | Facility: HOSPITAL | Age: 39
End: 2024-10-03
Payer: COMMERCIAL

## 2024-10-03 ENCOUNTER — ANESTHESIA (OUTPATIENT)
Dept: GASTROENTEROLOGY | Facility: HOSPITAL | Age: 39
End: 2024-10-03
Payer: COMMERCIAL

## 2024-10-03 ENCOUNTER — TELEPHONE (OUTPATIENT)
Dept: GASTROENTEROLOGY | Facility: CLINIC | Age: 39
End: 2024-10-03
Payer: COMMERCIAL

## 2024-10-03 VITALS
SYSTOLIC BLOOD PRESSURE: 109 MMHG | OXYGEN SATURATION: 100 % | DIASTOLIC BLOOD PRESSURE: 65 MMHG | RESPIRATION RATE: 24 BRPM | HEART RATE: 62 BPM

## 2024-10-03 DIAGNOSIS — K58.0 IRRITABLE BOWEL SYNDROME WITH DIARRHEA: ICD-10-CM

## 2024-10-03 DIAGNOSIS — K52.9 ILEITIS: ICD-10-CM

## 2024-10-03 DIAGNOSIS — R10.30 LOWER ABDOMINAL PAIN: ICD-10-CM

## 2024-10-03 LAB
B-HCG UR QL: NEGATIVE
EXPIRATION DATE: NORMAL
INTERNAL NEGATIVE CONTROL: NEGATIVE
INTERNAL POSITIVE CONTROL: POSITIVE
Lab: NORMAL

## 2024-10-03 PROCEDURE — 25010000002 PROPOFOL 10 MG/ML EMULSION: Performed by: NURSE ANESTHETIST, CERTIFIED REGISTERED

## 2024-10-03 PROCEDURE — S0260 H&P FOR SURGERY: HCPCS | Performed by: INTERNAL MEDICINE

## 2024-10-03 PROCEDURE — 25810000003 LACTATED RINGERS PER 1000 ML: Performed by: INTERNAL MEDICINE

## 2024-10-03 PROCEDURE — 81025 URINE PREGNANCY TEST: CPT | Performed by: INTERNAL MEDICINE

## 2024-10-03 PROCEDURE — 45378 DIAGNOSTIC COLONOSCOPY: CPT | Performed by: INTERNAL MEDICINE

## 2024-10-03 PROCEDURE — 25010000002 LIDOCAINE 2% SOLUTION: Performed by: NURSE ANESTHETIST, CERTIFIED REGISTERED

## 2024-10-03 PROCEDURE — 88305 TISSUE EXAM BY PATHOLOGIST: CPT | Performed by: INTERNAL MEDICINE

## 2024-10-03 RX ORDER — LIDOCAINE HYDROCHLORIDE 20 MG/ML
INJECTION, SOLUTION INFILTRATION; PERINEURAL AS NEEDED
Status: DISCONTINUED | OUTPATIENT
Start: 2024-10-03 | End: 2024-10-03 | Stop reason: SURG

## 2024-10-03 RX ORDER — SODIUM CHLORIDE, SODIUM LACTATE, POTASSIUM CHLORIDE, CALCIUM CHLORIDE 600; 310; 30; 20 MG/100ML; MG/100ML; MG/100ML; MG/100ML
1000 INJECTION, SOLUTION INTRAVENOUS CONTINUOUS
Status: DISCONTINUED | OUTPATIENT
Start: 2024-10-03 | End: 2024-10-03 | Stop reason: HOSPADM

## 2024-10-03 RX ORDER — PROPOFOL 10 MG/ML
VIAL (ML) INTRAVENOUS AS NEEDED
Status: DISCONTINUED | OUTPATIENT
Start: 2024-10-03 | End: 2024-10-03 | Stop reason: SURG

## 2024-10-03 RX ADMIN — LIDOCAINE HYDROCHLORIDE 100 MG: 20 INJECTION, SOLUTION INFILTRATION; PERINEURAL at 10:16

## 2024-10-03 RX ADMIN — PROPOFOL 150 MG: 10 INJECTION, EMULSION INTRAVENOUS at 10:16

## 2024-10-03 RX ADMIN — PROPOFOL 120 MCG/KG/MIN: 10 INJECTION, EMULSION INTRAVENOUS at 10:18

## 2024-10-03 RX ADMIN — SODIUM CHLORIDE, POTASSIUM CHLORIDE, SODIUM LACTATE AND CALCIUM CHLORIDE 1000 ML: 600; 310; 30; 20 INJECTION, SOLUTION INTRAVENOUS at 10:05

## 2024-10-03 NOTE — ANESTHESIA PREPROCEDURE EVALUATION
Anesthesia Evaluation     Patient summary reviewed and Nursing notes reviewed   NPO Solid Status: > 6 hours  NPO Liquid Status: > 2 hours           Airway   Mallampati: II  TM distance: >3 FB  Neck ROM: full  Dental - normal exam     Pulmonary    (+) a smoker Former,  (-) COPD, asthma  Cardiovascular   Exercise tolerance: good (4-7 METS)    (-) past MI, dysrhythmias, angina      Neuro/Psych  (-) seizures, CVA  GI/Hepatic/Renal/Endo    (-) GERD, liver disease, no renal disease, diabetes, no thyroid disorder    Musculoskeletal     Abdominal    Substance History      OB/GYN          Other                    Anesthesia Plan    ASA 1     MAC       Anesthetic plan, risks, benefits, and alternatives have been provided, discussed and informed consent has been obtained with: patient.    CODE STATUS:

## 2024-10-03 NOTE — ANESTHESIA POSTPROCEDURE EVALUATION
Patient: Jaymie Amaro    Procedure Summary       Date: 10/03/24 Room / Location:  [facility]    Anesthesia Start: 1011 Anesthesia Stop: 1034    Procedure: COLONOSCOPY to cecum intoo TI with cold biopsies Diagnosis:       Tortuous colon      Hemorrhoids      (Irritable bowel syndrome with diarrhea [K58.0])      (Lower abdominal pain [R10.30])      (Ileitis [K52.9])    Surgeons: Carlos Smith MD Provider: Shorty Cancino MD    Anesthesia Type: MAC ASA Status: 1            Anesthesia Type: MAC    Vitals  Vitals Value Taken Time   /65 10/03/24 1058   Temp     Pulse 59 10/03/24 1059   Resp 24 10/03/24 1056   SpO2 100 % 10/03/24 1059   Vitals shown include unfiled device data.        Post Anesthesia Care and Evaluation    Patient location during evaluation: PACU  Patient participation: complete - patient participated  Level of consciousness: awake  Pain management: satisfactory to patient    Airway patency: patent  Anesthetic complications: No anesthetic complications  PONV Status: controlled  Cardiovascular status: acceptable  Respiratory status: acceptable  Hydration status: acceptable

## 2024-10-03 NOTE — H&P
[facility]  Pre Procedure History & Physical    Chief Complaint:   Ileitis, IBS-D    Subjective     HPI:   This 39-year-old female presents the endoscopy suite for colonoscopic evaluation.  She has been diagnosed with ileitis established endoscopically in 2020.  She was referred for evaluation at [facility] without definitive diagnosis established.  Given her persistent symptoms she is undergoing colonoscopic examination for further verification.    Past Medical History:   Past Medical History:   Diagnosis Date    Irritable bowel syndrome     Lactose intolerance        Past Surgical History:  Past Surgical History:   Procedure Laterality Date    COLONOSCOPY N/A 11/17/2020    Procedure: COLONOSCOPY into cecum/terminal ileum with biopsy;  Surgeon: Carlos Smith MD;  Location: [facility] ENDOSCOPY;  Service: Gastroenterology;  Laterality: N/A;  ileitis    TUBAL ABDOMINAL LIGATION         Family History:  Family History   Problem Relation Age of Onset    Colon polyps Mother     Colon polyps Maternal Uncle     Colon polyps Maternal Uncle     Colon cancer Maternal Great-Grandmother     Malig Hyperthermia Neg Hx        Social History:   reports that she has quit smoking. Her smoking use included cigarettes. She started smoking about 26 years ago. She has a 26.8 pack-year smoking history. She has never used smokeless tobacco. She reports current alcohol use of about 8.0 standard drinks of alcohol per week. She reports that she does not use drugs.    Medications:   No medications prior to admission.       Allergies:  Topiramate    ROS:    Pertinent items are noted in HPI, all other systems reviewed and negative     Objective     There were no vitals taken for this visit.    Physical Exam   Constitutional: Pt is oriented to person, place, and time and well-developed, well-nourished, and in no distress.   Mouth/Throat: Oropharynx is clear and moist.   Neck: Normal range of motion.    Cardiovascular: Normal rate, regular rhythm and normal heart sounds.    Pulmonary/Chest: Effort normal and breath sounds normal.   Abdominal: Soft. Nontender  Skin: Skin is warm and dry.   Psychiatric: Mood, memory, affect and judgment normal.     Assessment & Plan     Diagnosis:  Ileitis  IBS-D    Anticipated Surgical Procedure:  Colonoscopy    The risks, benefits, and alternatives of this procedure have been discussed with the patient or the responsible party- the patient understands and agrees to proceed.

## 2024-10-03 NOTE — TELEPHONE ENCOUNTER
SW PT AGREEABLE TO ARRIVING AT 945AM D/T ANOTHER PT CANCELLING CORAL S[facility]VED[facility] IN ENDO PT PLACED TO ARRIVE AT 945AM,OK FOR HUB TO RELAY

## 2024-10-07 LAB
CYTO UR: NORMAL
LAB AP CASE REPORT: NORMAL
PATH REPORT.FINAL DX SPEC: NORMAL
PATH REPORT.GROSS SPEC: NORMAL

## 2024-10-10 ENCOUNTER — TELEPHONE (OUTPATIENT)
Dept: GASTROENTEROLOGY | Facility: CLINIC | Age: 39
End: 2024-10-10
Payer: COMMERCIAL

## 2024-10-10 NOTE — TELEPHONE ENCOUNTER
----- Message from Carlos Smith sent at 10/9/2024  2:01 PM EDT -----  Regarding: Biopsy results  K to call results, essentially all normal.  ----- Message -----  From: Lab, Background User  Sent: 10/7/2024   7:38 AM EDT  To: Carlos GOMEZ MD

## 2024-12-06 ENCOUNTER — TELEPHONE (OUTPATIENT)
Dept: GASTROENTEROLOGY | Facility: CLINIC | Age: 39
End: 2024-12-06
Payer: COMMERCIAL

## 2024-12-06 NOTE — TELEPHONE ENCOUNTER
Called and LVM Hello this is [facility] according to our records we have ordered a Small bowel follow through on you and it has not been completed. Please call [phone] to schedule. If you have already completed it please let us know when and where so we can obtain your records. If you wish not to proceed with the testing please let us know so we can cancel out the order. Thanks    Ok for hub to relay

## 2025-04-30 ENCOUNTER — OFFICE VISIT (OUTPATIENT)
Dept: OBSTETRICS AND GYNECOLOGY | Facility: CLINIC | Age: 40
End: 2025-04-30
Payer: COMMERCIAL

## 2025-04-30 VITALS
WEIGHT: 140 LBS | DIASTOLIC BLOOD PRESSURE: 71 MMHG | SYSTOLIC BLOOD PRESSURE: 117 MMHG | HEIGHT: 65 IN | BODY MASS INDEX: 23.32 KG/M2

## 2025-04-30 DIAGNOSIS — Z01.419 ENCOUNTER FOR GYNECOLOGICAL EXAMINATION WITHOUT ABNORMAL FINDING: Primary | ICD-10-CM

## 2025-04-30 NOTE — PROGRESS NOTES
GYN Annual Exam     CC- Here for annual exam.     Jaymie Amaro is a 39 y.o. female who presents for annual well woman exam. Periods are absent since endometrial ablation lasting 0 days. Dysmenorrhea:none. Cyclic symptoms include none. No intermenstrual bleeding, spotting, or discharge.  She is a former patient of mine.  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  She has used Aldara that helped for the lesion but she did not continue treatment.  She had tubal sterilization and had an endometrial ablation in the past.  She also wants to discuss getting caught up on her Pap screen as well as decreased libido.    OB History    No obstetric history on file.         Current contraception: tubal ligation  History of abnormal Pap smear: no  Family history of uterine, colon or ovarian cancer: yes - colon polyps and colon cancer  History of abnormal mammogram: no  Family history of breast cancer: no  Last Pap : 6 years ago    Past Medical History:   Diagnosis Date    Irritable bowel syndrome     Lactose intolerance        Past Surgical History:   Procedure Laterality Date    COLONOSCOPY N/A 11/17/2020    Procedure: COLONOSCOPY into cecum/terminal ileum with biopsy;  Surgeon: Carlos Smith MD;  Location: [facility] ENDOSCOPY;  Service: Gastroenterology;  Laterality: N/A;  ileitis    COLONOSCOPY N/A 10/3/2024    Procedure: COLONOSCOPY to cecum intoo TI with cold biopsies;  Surgeon: Carlos Smith MD;  Location: [facility] ENDOSCOPY;  Service: Gastroenterology;  Laterality: N/A;  pre: irritable bowel syndrome with constipation  post: hemorhoids, torotus colon    TUBAL ABDOMINAL LIGATION           Current Outpatient Medications:     imiquimod (ALDARA) 5 % cream, by Intratympanic route 3 (Three) Times a Week., Disp: , Rfl:     valACYclovir (VALTREX) 1000 MG tablet, Take 1 tablet by mouth Daily As Needed., Disp: , Rfl:     dicyclomine (BENTYL) 10 MG capsule, Take 1 capsule by mouth 4 (Four) Times a Day As  "Needed for Abdominal Cramping. (Patient not taking: Reported on 4/30/2025), Disp: 30 capsule, Rfl: 5    Allergies   Allergen Reactions    Topiramate Diarrhea and Nausea Only       Social History     Tobacco Use    Smoking status: Former     Current packs/day: 1.00     Average packs/day: 1 pack/day for 27.3 years (27.3 ttl pk-yrs)     Types: Cigarettes     Start date: 1/1/1998    Smokeless tobacco: Never    Tobacco comments:     I currently vape   Vaping Use    Vaping status: Never Used   Substance Use Topics    Alcohol use: Yes     Alcohol/week: 8.0 standard drinks of alcohol     Types: 8 Cans of beer per week     Comment: Weekends    Drug use: No       Family History   Problem Relation Age of Onset    Colon polyps Mother     Colon polyps Maternal Uncle     Colon polyps Maternal Uncle     Colon cancer Maternal Great-Grandmother     Malig Hyperthermia Neg Hx        Review of Systems   Constitutional:  Negative for fatigue and fever.   Genitourinary:  Positive for decreased libido and genital sores. Negative for menstrual problem and pelvic pain.       /71   Ht 164.6 cm (64.8\")   Wt 63.5 kg (140 lb)   BMI 23.44 kg/m²     Physical Exam  Constitutional:       Appearance: She is normal weight.   Genitourinary:      Bladder and urethral meatus normal.      No lesions in the vagina.      Right Labia: No lesions.     Left Labia: No lesions.           No vaginal discharge, tenderness or bleeding.      No vaginal prolapse present.     No vaginal atrophy present.       Right Adnexa: not tender, not full and no mass present.     Left Adnexa: not tender, not full and no mass present.     No cervical motion tenderness, discharge, friability or lesion.      Uterus is not enlarged, fixed or tender.      No uterine mass detected.     Uterus is midaxial.   Breasts:     Right: No mass, nipple discharge, skin change or tenderness.      Left: No mass, nipple discharge, skin change or tenderness.   Abdominal:      General: " Abdomen is flat.      Palpations: Abdomen is soft. There is no mass.      Tenderness: There is no abdominal tenderness.   Neurological:      Mental Status: She is alert.   Vitals reviewed.               Assessment     1) GYN annual well woman exam.   2) perianal condyloma.  Patient will begin treating again with Christa.  I stressed the importance of doing the appropriate dosing which is 3 nights in a row followed by 4 nights off and then repeating as needed until the lesions are gone.  There is nothing on her exam suspicious for dysplasia.  3) diminished libido.  We discussed different etiologies and she thinks the fatigue and caring for her 76-year-old father are major contributors.  I would agree, I did talk about FDA approved medication Addyi and the use of testosterone preparations to help as well and she is interested in the testosterone preparation.  Will prescribe 2% testosterone cream to do 3 times weekly with [facility].     Plan     1) Breast Health - Clinical breast exam yearly, Discussed American cancer society recommendations for breast cancer screening, and Self breast awareness monthly  2) Pap -done today with high-risk HPV  3) Smoking status-negative  4) Encouraged to be wary of information obtained via social media and internet based on source and search.  5) Follow up prn and one year.       Wes Roberts MD   4/30/2025  13:04 EDT

## 2025-05-02 ENCOUNTER — TELEPHONE (OUTPATIENT)
Dept: OBSTETRICS AND GYNECOLOGY | Facility: CLINIC | Age: 40
End: 2025-05-02
Payer: COMMERCIAL

## 2025-05-02 NOTE — TELEPHONE ENCOUNTER
"Patient is calling in regards to the medications that were discussed at her visit on 4/30. It was 2% testosterone cream, and also a medication called \"Addyi\" do you mind to send these in please? I added a compound pharmacy to her chart for the testosterone cream.     Thanks!  "

## (undated) DEVICE — CANN O2 ETCO2 FITS ALL CONN CO2 SMPL A/ 7IN DISP LF

## (undated) DEVICE — TUBING, SUCTION, 1/4" X 10', STRAIGHT: Brand: MEDLINE

## (undated) DEVICE — KT ORCA ORCAPOD DISP STRL

## (undated) DEVICE — ADAPT CLN BIOGUARD AIR/H2O DISP

## (undated) DEVICE — SENSR O2 OXIMAX FNGR A/ 18IN NONSTR

## (undated) DEVICE — LN SMPL CO2 SHTRM SD STREAM W/M LUER

## (undated) DEVICE — FRCP BX RADJAW4 NDL 2.8 240CM LG OG BX40

## (undated) DEVICE — SINGLE-USE BIOPSY FORCEPS: Brand: RADIAL JAW 4

## (undated) DEVICE — BITEBLOCK OMNI BLOC

## (undated) DEVICE — THE TORRENT IRRIGATION SCOPE CONNECTOR IS USED WITH THE TORRENT IRRIGATION TUBING TO PROVIDE IRRIGATION FLUIDS SUCH AS STERILE WATER DURING GASTROINTESTINAL ENDOSCOPIC PROCEDURES WHEN USED IN CONJUNCTION WITH AN IRRIGATION PUMP (OR ELECTROSURGICAL UNIT).: Brand: TORRENT